# Patient Record
Sex: FEMALE | Race: WHITE
[De-identification: names, ages, dates, MRNs, and addresses within clinical notes are randomized per-mention and may not be internally consistent; named-entity substitution may affect disease eponyms.]

---

## 2019-02-09 ENCOUNTER — HOSPITAL ENCOUNTER (EMERGENCY)
Dept: HOSPITAL 93 - ER | Age: 79
Discharge: HOME | End: 2019-02-09
Payer: COMMERCIAL

## 2019-02-09 VITALS — HEIGHT: 59 IN | BODY MASS INDEX: 35.08 KG/M2 | WEIGHT: 174 LBS

## 2019-02-09 DIAGNOSIS — J09.X2: Primary | ICD-10-CM

## 2019-02-09 DIAGNOSIS — J45.998: ICD-10-CM

## 2021-10-27 ENCOUNTER — HOSPITAL ENCOUNTER (EMERGENCY)
Dept: HOSPITAL 93 - ER | Age: 81
Discharge: HOME | End: 2021-10-27
Payer: COMMERCIAL

## 2021-10-27 VITALS — HEIGHT: 60 IN | WEIGHT: 160 LBS | BODY MASS INDEX: 31.41 KG/M2

## 2021-10-27 DIAGNOSIS — R10.13: ICD-10-CM

## 2021-10-27 DIAGNOSIS — K29.70: Primary | ICD-10-CM

## 2022-02-16 ENCOUNTER — HOSPITAL ENCOUNTER (EMERGENCY)
Dept: HOSPITAL 93 - ER | Age: 82
Discharge: HOME | End: 2022-02-16
Payer: COMMERCIAL

## 2022-02-16 VITALS — WEIGHT: 155 LBS | BODY MASS INDEX: 30.43 KG/M2 | HEIGHT: 60 IN

## 2022-02-16 DIAGNOSIS — E11.9: ICD-10-CM

## 2022-02-16 DIAGNOSIS — I10: ICD-10-CM

## 2022-02-16 DIAGNOSIS — J44.9: Primary | ICD-10-CM

## 2022-02-16 DIAGNOSIS — Z88.0: ICD-10-CM

## 2022-02-16 DIAGNOSIS — Z79.84: ICD-10-CM

## 2023-05-08 ENCOUNTER — HOSPITAL ENCOUNTER (EMERGENCY)
Dept: HOSPITAL 93 - ER | Age: 83
Discharge: HOME | End: 2023-05-08
Payer: COMMERCIAL

## 2023-05-08 VITALS — BODY MASS INDEX: 31.41 KG/M2 | HEIGHT: 60 IN | WEIGHT: 160 LBS

## 2023-05-08 DIAGNOSIS — R10.9: Primary | ICD-10-CM

## 2023-05-08 DIAGNOSIS — I10: ICD-10-CM

## 2023-05-08 DIAGNOSIS — E11.9: ICD-10-CM

## 2023-05-08 DIAGNOSIS — Z88.0: ICD-10-CM

## 2023-05-08 DIAGNOSIS — Z79.84: ICD-10-CM

## 2024-12-07 ENCOUNTER — HOSPITAL ENCOUNTER (INPATIENT)
Dept: HOSPITAL 93 - ER | Age: 84
LOS: 4 days | Discharge: HOME | DRG: 193 | End: 2024-12-11
Attending: GENERAL PRACTICE | Admitting: GENERAL PRACTICE
Payer: COMMERCIAL

## 2024-12-07 VITALS — WEIGHT: 293 LBS | BODY MASS INDEX: 57.52 KG/M2 | HEIGHT: 60 IN

## 2024-12-07 DIAGNOSIS — A49.3: ICD-10-CM

## 2024-12-07 DIAGNOSIS — Z79.4: ICD-10-CM

## 2024-12-07 DIAGNOSIS — J84.10: ICD-10-CM

## 2024-12-07 DIAGNOSIS — E11.65: ICD-10-CM

## 2024-12-07 DIAGNOSIS — J44.1: ICD-10-CM

## 2024-12-07 DIAGNOSIS — E78.5: ICD-10-CM

## 2024-12-07 DIAGNOSIS — J96.01: ICD-10-CM

## 2024-12-07 DIAGNOSIS — J18.9: Primary | ICD-10-CM

## 2024-12-07 LAB
ANION GAP SERPL CALC-SCNC: 8 MMOL/L (ref 10–20)
BASE EXCESS BLD CALC-SCNC: 2.8 MMOL/L
BASOPHILS NFR BLD AUTO: 0.4 % (ref 0–1.5)
BUN SERPL-MCNC: 23 MG/DL (ref 7–18)
BUN/CREAT SERPL: 30 (ref 7–25)
CALCIUM SERPL-MCNC: 9.7 MG/DL (ref 8.5–10.1)
CHLORIDE SERPL-SCNC: 105 MMOL/L (ref 98–107)
CO2 SERPL-SCNC: 32 MEQ/L (ref 21–32)
CREAT SERPL-MCNC: 0.76 MG/DL (ref 0.55–1.02)
EGFRCR SERPLBLD CKD-EPI 2021: 72.86 ML/MIN/{1.73_M2}
EOSINOPHIL NFR BLD AUTO: 2.4 % (ref 0–7)
ERYTHROCYTE [DISTWIDTH] IN BLOOD BY AUTOMATED COUNT: 15.3 % (ref 11.5–14.5)
GLUCOSE SERPL-MCNC: 184 MG/DL (ref 65–100)
HCO3 BLDA-SCNC: 26.6 MMOL/L (ref 23–25)
HCT VFR BLD AUTO: 45.1 % (ref 36–45)
HGB BLD-MCNC: 15.6 G/DL (ref 12–15)
LYMPHOCYTES NFR BLD AUTO: 13.6 % (ref 12–44)
MCH RBC QN AUTO: 30.9 PG (ref 27–32)
MCHC RBC AUTO-ENTMCNC: 34.7 G/DL (ref 32–36)
MCV RBC AUTO: 89.1 FL (ref 80–100)
MONOCYTES NFR BLD AUTO: 12 % (ref 2–10)
NEUTROPHILS NFR BLD AUTO: 71.6 % (ref 47–76)
O2 CT BLDA-SCNC: 28 %
OSMOLALITY SERPL: 288 MOSM/KG (ref 275–295)
PH BLDA: 7.46 [PH] (ref 7.35–7.45)
PLATELET # BLD AUTO: 302 K/UL (ref 150–450)
PMV BLD AUTO: 9 FL (ref 7.2–11.1)
PO2 BLDA: 60.3 MMHG (ref 80–100)
POTASSIUM SERPL-SCNC: 4.81 MEQ/L (ref 3.5–5.1)
RBC # BLD AUTO: 5.06 M/UL (ref 4–6)
SODIUM SERPL-SCNC: 140 MMOL/L (ref 136–145)
WBC # BLD AUTO: 14 K/UL (ref 4.5–11)

## 2024-12-07 PROCEDURE — BW24YZZ COMPUTERIZED TOMOGRAPHY (CT SCAN) OF CHEST AND ABDOMEN USING OTHER CONTRAST: ICD-10-PCS | Performed by: GENERAL PRACTICE

## 2024-12-07 NOTE — NUR
PACIENTE ALERTA Y ORIENTADA X 3. REFIERE 3 SEMANAS CON ASMA. INDICA SER
PACIENTE DE COPD. SATURACION EN 93% AL REALIZAR TRIAGE

## 2024-12-07 NOTE — NUR
SE ORIENTA A PACIENTE SOBRE TRATAMIENTO MEDICO, REFIERE ENTENDER. SE COLECTAN
MUESTRAS DE LABORATORIO Y SE CANALIZA A PACIENTE BAJO MEDIDAS ASEPTICAS. SE
ADMINISTRAN MEDICAMENTOS GIOVANI ORDEN MEDICA. SE NOTIFICAN X-RAY, ABGS Y
TERAPIAS RESPIRATORIAS.

## 2024-12-07 NOTE — NUR
PTE ALERTA Y ORIENTADA X3 DEL TURNO ANTERIOR EN CAMA CON BARANDAS ELEVADAS.
PENDIENTE RESULTADOS DE LAB Y CT DE [place].

## 2024-12-08 VITALS — OXYGEN SATURATION: 93 %

## 2024-12-08 VITALS — OXYGEN SATURATION: 96 %

## 2024-12-08 LAB
BACTERIA UR QL AUTO: 223.9 UL (ref 0–1933)
CASTS # UR AUTO: 1.32 UL (ref 0–1.4)
EPI CELLS URNS QL MICRO: 32.4 UL (ref 0–38.8)
GLUCOSE UR STRIP-MCNC: >=1000 MG/DL
PH UR: 5 [PH] (ref 5–8)
PROT UR STRIP-MCNC: 30 MG/DL
RBC #/AREA URNS AUTO: 5.3 UL (ref 0–20.8)
SP GR UR STRIP: 1.03 (ref 1–1.03)
UROBILINOGEN UR STRIP-MCNC: 0.2 E.U./DL
WBC URNS QL MICRO: 7.1 UL (ref 0–23.2)

## 2024-12-09 VITALS — OXYGEN SATURATION: 97 %

## 2024-12-09 VITALS — OXYGEN SATURATION: 95 %

## 2024-12-09 LAB
ALBUMIN SERPL-MCNC: 3 GM/DL (ref 3.4–5)
ALP SERPL-CCNC: 53 U/L (ref 50–136)
ALT SERPL-CCNC: 32 U/L (ref 12–78)
ANION GAP SERPL CALC-SCNC: 9 MMOL/L (ref 10–20)
BASOPHILS NFR BLD AUTO: 0 % (ref 0–1.5)
BILIRUB SERPL-MCNC: 0.58 MG/DL (ref 0.3–1.2)
BUN SERPL-MCNC: 27 MG/DL (ref 7–18)
BUN/CREAT SERPL: 36 (ref 7–25)
CALCIUM SERPL-MCNC: 9.2 MG/DL (ref 8.5–10.1)
CHLORIDE SERPL-SCNC: 103 MMOL/L (ref 98–107)
CO2 SERPL-SCNC: 30 MEQ/L (ref 21–32)
CREAT SERPL-MCNC: 0.76 MG/DL (ref 0.55–1.02)
EGFRCR SERPLBLD CKD-EPI 2021: 72.86 ML/MIN/{1.73_M2}
EOSINOPHIL NFR BLD AUTO: 0 % (ref 0–7)
ERYTHROCYTE [DISTWIDTH] IN BLOOD BY AUTOMATED COUNT: 15.3 % (ref 11.5–14.5)
GLOBULIN SER CALC-MCNC: 3.3 G/DL (ref 2.4–3.5)
GLUCOSE SERPL-MCNC: 306 MG/DL (ref 65–100)
HCT VFR BLD AUTO: 39.2 % (ref 36–45)
HGB BLD-MCNC: 13.7 G/DL (ref 12–15)
LYMPHOCYTES NFR BLD AUTO: 3.9 % (ref 12–44)
MAGNESIUM SERPL-MCNC: 1.9 MG/DL (ref 1.8–2.4)
MCH RBC QN AUTO: 31 PG (ref 27–32)
MCHC RBC AUTO-ENTMCNC: 34.8 G/DL (ref 32–36)
MCV RBC AUTO: 89 FL (ref 80–100)
MONOCYTES NFR BLD AUTO: 4.1 % (ref 2–10)
NEUTROPHILS NFR BLD AUTO: 92 % (ref 47–76)
OSMOLALITY SERPL: 290 MOSM/KG (ref 275–295)
PHOSPHATE SERPL-MCNC: 3.1 MG/DL (ref 2.5–4.9)
PLATELET # BLD AUTO: 278 K/UL (ref 150–450)
PMV BLD AUTO: 9.3 FL (ref 7.2–11.1)
POTASSIUM SERPL-SCNC: 5.06 MEQ/L (ref 3.5–5.1)
PROT SERPL-MCNC: 6.3 GM/DL (ref 6.4–8.2)
RBC # BLD AUTO: 4.41 M/UL (ref 4–6)
SCC AG SERPL-SCNC: 21 U/L (ref 15–37)
SODIUM SERPL-SCNC: 137 MMOL/L (ref 136–145)
WBC # BLD AUTO: 17 K/UL (ref 4.5–11)

## 2024-12-10 VITALS — OXYGEN SATURATION: 95 %

## 2024-12-10 VITALS — OXYGEN SATURATION: 97 %

## 2024-12-10 VITALS — OXYGEN SATURATION: 99 %

## 2024-12-10 LAB
BASE EXCESS BLD CALC-SCNC: 5.5 MMOL/L
HCO3 BLDA-SCNC: 31.6 MMOL/L (ref 23–25)
O2 CT BLDA-SCNC: 21 %
PH BLDA: 7.41 [PH] (ref 7.35–7.45)
PO2 BLDA: 50.9 MMHG (ref 80–100)

## 2024-12-11 VITALS — OXYGEN SATURATION: 96 %

## 2024-12-11 LAB
ALBUMIN SERPL-MCNC: 2.9 GM/DL (ref 3.4–5)
ALP SERPL-CCNC: 54 U/L (ref 50–136)
ALT SERPL-CCNC: 42 U/L (ref 12–78)
ANION GAP SERPL CALC-SCNC: 7 MMOL/L (ref 10–20)
BASOPHILS NFR BLD AUTO: 0.8 % (ref 0–1.5)
BILIRUB SERPL-MCNC: 0.72 MG/DL (ref 0.3–1.2)
BUN SERPL-MCNC: 22 MG/DL (ref 7–18)
BUN/CREAT SERPL: 31 (ref 7–25)
CALCIUM SERPL-MCNC: 8.9 MG/DL (ref 8.5–10.1)
CHLORIDE SERPL-SCNC: 105 MMOL/L (ref 98–107)
CO2 SERPL-SCNC: 35 MEQ/L (ref 21–32)
CREAT SERPL-MCNC: 0.71 MG/DL (ref 0.55–1.02)
EGFRCR SERPLBLD CKD-EPI 2021: 78.81 ML/MIN/{1.73_M2}
EOSINOPHIL NFR BLD AUTO: 1.4 % (ref 0–7)
ERYTHROCYTE [DISTWIDTH] IN BLOOD BY AUTOMATED COUNT: 15.2 % (ref 11.5–14.5)
GLOBULIN SER CALC-MCNC: 3.1 G/DL (ref 2.4–3.5)
GLUCOSE SERPL-MCNC: 162 MG/DL (ref 65–100)
HCT VFR BLD AUTO: 41.1 % (ref 36–45)
HGB BLD-MCNC: 14.2 G/DL (ref 12–15)
LYMPHOCYTES NFR BLD AUTO: 15.4 % (ref 12–44)
MAGNESIUM SERPL-MCNC: 1.8 MG/DL (ref 1.8–2.4)
MCH RBC QN AUTO: 31 PG (ref 27–32)
MCHC RBC AUTO-ENTMCNC: 34.6 G/DL (ref 32–36)
MCV RBC AUTO: 89.6 FL (ref 80–100)
MONOCYTES NFR BLD AUTO: 8.9 % (ref 2–10)
NEUTROPHILS NFR BLD AUTO: 73.5 % (ref 47–76)
OSMOLALITY SERPL: 290 MOSM/KG (ref 275–295)
PHOSPHATE SERPL-MCNC: 3.2 MG/DL (ref 2.5–4.9)
PLATELET # BLD AUTO: 240 K/UL (ref 150–450)
PMV BLD AUTO: 9.1 FL (ref 7.2–11.1)
POTASSIUM SERPL-SCNC: 4.55 MEQ/L (ref 3.5–5.1)
PROT SERPL-MCNC: 6 GM/DL (ref 6.4–8.2)
RBC # BLD AUTO: 4.59 M/UL (ref 4–6)
SCC AG SERPL-SCNC: 22 U/L (ref 15–37)
SODIUM SERPL-SCNC: 142 MMOL/L (ref 136–145)
WBC # BLD AUTO: 12.1 K/UL (ref 4.5–11)